# Patient Record
Sex: FEMALE | Race: WHITE | NOT HISPANIC OR LATINO | Employment: OTHER | URBAN - METROPOLITAN AREA
[De-identification: names, ages, dates, MRNs, and addresses within clinical notes are randomized per-mention and may not be internally consistent; named-entity substitution may affect disease eponyms.]

---

## 2017-05-11 ENCOUNTER — OFFICE VISIT - RIVER FALLS (OUTPATIENT)
Dept: FAMILY MEDICINE | Facility: CLINIC | Age: 77
End: 2017-05-11

## 2017-05-11 ENCOUNTER — COMMUNICATION - RIVER FALLS (OUTPATIENT)
Dept: FAMILY MEDICINE | Facility: CLINIC | Age: 77
End: 2017-05-11

## 2017-05-11 ASSESSMENT — MIFFLIN-ST. JEOR: SCORE: 1275.54

## 2017-05-18 LAB — GLUCOSE BLD-MCNC: 96 MG/DL (ref 65–99)

## 2017-11-20 ENCOUNTER — AMBULATORY - RIVER FALLS (OUTPATIENT)
Dept: FAMILY MEDICINE | Facility: CLINIC | Age: 77
End: 2017-11-20

## 2018-05-07 ENCOUNTER — OFFICE VISIT - RIVER FALLS (OUTPATIENT)
Dept: FAMILY MEDICINE | Facility: CLINIC | Age: 78
End: 2018-05-07

## 2018-05-07 ASSESSMENT — MIFFLIN-ST. JEOR: SCORE: 1260.12

## 2019-06-03 ENCOUNTER — OFFICE VISIT - RIVER FALLS (OUTPATIENT)
Dept: FAMILY MEDICINE | Facility: CLINIC | Age: 79
End: 2019-06-03

## 2019-06-03 ASSESSMENT — MIFFLIN-ST. JEOR: SCORE: 1214.76

## 2020-07-27 ENCOUNTER — VIRTUAL VISIT - RIVER FALLS (OUTPATIENT)
Dept: FAMILY MEDICINE | Facility: CLINIC | Age: 80
End: 2020-07-27

## 2021-07-07 ENCOUNTER — OFFICE VISIT - RIVER FALLS (OUTPATIENT)
Dept: FAMILY MEDICINE | Facility: CLINIC | Age: 81
End: 2021-07-07

## 2021-07-07 ENCOUNTER — TRANSFERRED RECORDS (OUTPATIENT)
Dept: MULTI SPECIALTY CLINIC | Facility: CLINIC | Age: 81
End: 2021-07-07

## 2021-07-07 ASSESSMENT — MIFFLIN-ST. JEOR: SCORE: 1281.89

## 2021-07-08 ENCOUNTER — COMMUNICATION - RIVER FALLS (OUTPATIENT)
Dept: FAMILY MEDICINE | Facility: CLINIC | Age: 81
End: 2021-07-08

## 2021-07-08 LAB
25(OH)D3 SERPL-MCNC: 57 NG/ML (ref 30–100)
CHOLEST SERPL-MCNC: 253 MG/DL
CHOLEST/HDLC SERPL: 2.7 {RATIO}
ERYTHROCYTE [DISTWIDTH] IN BLOOD BY AUTOMATED COUNT: 12.1 % (ref 11–15)
GLUCOSE BLD-MCNC: 105 MG/DL (ref 65–99)
HCT VFR BLD AUTO: 42 % (ref 35–45)
HDLC SERPL-MCNC: 95 MG/DL
HGB BLD-MCNC: 14.5 GM/DL (ref 11.7–15.5)
LDLC SERPL CALC-MCNC: 136 MG/DL
MCH RBC QN AUTO: 35.3 PG (ref 27–33)
MCHC RBC AUTO-ENTMCNC: 34.5 GM/DL (ref 32–36)
MCV RBC AUTO: 102.2 FL (ref 80–100)
NONHDLC SERPL-MCNC: 158 MG/DL
PLATELET # BLD AUTO: 283 10*3/UL (ref 140–400)
PMV BLD: 11 FL (ref 7.5–12.5)
RBC # BLD AUTO: 4.11 10*6/UL (ref 3.8–5.1)
TRIGL SERPL-MCNC: 116 MG/DL
VIT B12 SERPL-MCNC: 301 PG/ML (ref 200–1100)
WBC # BLD AUTO: 5.6 10*3/UL (ref 3.8–10.8)

## 2021-10-22 ENCOUNTER — OFFICE VISIT - RIVER FALLS (OUTPATIENT)
Dept: FAMILY MEDICINE | Facility: CLINIC | Age: 81
End: 2021-10-22

## 2022-02-11 VITALS
TEMPERATURE: 98.2 F | HEART RATE: 56 BPM | DIASTOLIC BLOOD PRESSURE: 74 MMHG | WEIGHT: 161 LBS | BODY MASS INDEX: 23.05 KG/M2 | HEIGHT: 70 IN | SYSTOLIC BLOOD PRESSURE: 136 MMHG

## 2022-02-11 VITALS
BODY MASS INDEX: 22.56 KG/M2 | HEIGHT: 70 IN | SYSTOLIC BLOOD PRESSURE: 132 MMHG | HEART RATE: 52 BPM | TEMPERATURE: 98 F | WEIGHT: 157.6 LBS | DIASTOLIC BLOOD PRESSURE: 80 MMHG

## 2022-02-11 VITALS
HEART RATE: 60 BPM | WEIGHT: 147.6 LBS | TEMPERATURE: 98 F | DIASTOLIC BLOOD PRESSURE: 70 MMHG | BODY MASS INDEX: 21.13 KG/M2 | HEIGHT: 70 IN | SYSTOLIC BLOOD PRESSURE: 128 MMHG

## 2022-02-11 VITALS
HEIGHT: 70 IN | SYSTOLIC BLOOD PRESSURE: 152 MMHG | HEART RATE: 75 BPM | DIASTOLIC BLOOD PRESSURE: 78 MMHG | BODY MASS INDEX: 23.3 KG/M2

## 2022-02-11 VITALS
BODY MASS INDEX: 23.25 KG/M2 | HEIGHT: 70 IN | HEART RATE: 93 BPM | DIASTOLIC BLOOD PRESSURE: 84 MMHG | SYSTOLIC BLOOD PRESSURE: 148 MMHG | WEIGHT: 162.4 LBS | OXYGEN SATURATION: 97 % | TEMPERATURE: 97.2 F

## 2022-02-16 NOTE — TELEPHONE ENCOUNTER
---------------------  From: Mariah Valdovinos CMA ([facility]Rx Pool ([address]_[place] [facility] [place]))   To: [employer] Message Pool ([address]224_[facility]);     Sent: 5/11/2021 12:11:15 PM CDT  Subject: FW: Medication Management   Due Date/Time: 5/11/2021 8:20:00 AM CDT     last visit 6/2019 Physical  LRF 7/16/2020 #60, 4 Rf's    Pt scheduled for physical 7/7/21    Please advise       ------------------------------------------  From: [facility] #[id number]  To: Esha Fritz MD  Sent: May 10, 2021 8:20:17 AM CDT  Subject: Medication Management  Due: April 27, 2021 12:41:41 AM CDT     ** On Hold Pending Signature **     Dispensed Drug: atropine-diphenoxylate (atropine-diphenoxylate 0.025 mg-2.5 mg oral tablet), TAKE 2 TABLETS BY MOUTH FOUR TIMES DAILY AS NEEDED FOR LOOSE STOOLS  Quantity: 60 tab(s)  Days Supply: 8  Refills: 0  Substitutions Allowed  Notes from Pharmacy:  ---------------------------------------------------------------  From: Claire Hoover CMA ([facility] Message Pool (32224_[facility]))   To: Esha Fritz MD;     Sent: 5/11/2021 1:50:31 PM CDT  Subject: FW: Medication Management   Due Date/Time: 5/11/2021 8:20:00 AM CDT---------------------  From: Esha Fritz MD   To: [facility] #[id number]    Sent: 5/11/2021 3:06:11 PM CDT  Subject: FW: Medication Management     ** Not Approved: responded to by other means **  atropine-diphenoxylate (DIPHENOXYLATE/ATROPINE 2.5MG TABS)  TAKE 2 TABLETS BY MOUTH FOUR TIMES DAILY AS NEEDED FOR LOOSE STOOLS  Qty:  60 tab(s)        Days Supply:  8        Refills:  0          Substitutions Allowed     Route To [facility] #[id number]  ** Submitted: **  Order:atropine-diphenoxylate (Lomotil 2.5 mg-0.025 mg oral tablet)  2 tab(s)  PO  QID  start prior authorization if needed  Qty:  60 tab(s)        Refills:  1          Substitutions Allowed     PRN  for loose stools      Route To [facility] #[id number]    Signed by Esha Fritz MD  5/11/2021 8:06:00  PM [facility]---------------------  From: Esha Fritz MD   To: [employer] Pool (32224_[facility]);     Sent: 5/11/2021 3:07:05 PM CDT  Subject: RE: Medication Management

## 2022-02-16 NOTE — TELEPHONE ENCOUNTER
Entered by Claire Hoover CMA on September 07, 2021 11:07:08 AM CDT  ---------------------  From: Claire Hoover CMA   To: [facility] #[id number]    Sent: 9/7/2021 11:07:08 AM CDT  Subject: Medication Management     ** Not Approved: Patient has requested refill too soon, LR on 7/7/21 for # 12 and 4 refills **  ergocalciferol (VITAMIN D2 50,000IU (ERGO) CAP RX)  TAKE 1 CAPSULE BY MOUTH EVERY WEEK  Qty:  12 cap(s)        Days Supply:  84        Refills:  0          Substitutions Allowed     Route To Pharmacy - [facility] #[id number]   Signed by Claire Hoover CMA            ------------------------------------------  From: [facility] #[id number]  To: Esha Fritz MD  Sent: September 7, 2021 2:39:08 AM CDT  Subject: Medication Management  Due: August 20, 2021 11:16:59 AM CDT     ** On Hold Pending Signature **     Dispensed Drug: ergocalciferol (ergocalciferol 1.25 mg (50,000 intl units) oral capsule), TAKE 1 CAPSULE BY MOUTH EVERY WEEK  Quantity: 12 cap(s)  Days Supply: 84  Refills: 0  Substitutions Allowed  Notes from Pharmacy:  ------------------------------------------

## 2022-02-16 NOTE — TELEPHONE ENCOUNTER
Entered by Sena Kern CMA on September 17, 2019 2:22:02 PM CDT  ---------------------  From: Sena Kern CMA   To: [facility] #[id number]    Sent: 9/17/2019 2:22:02 PM CDT  Subject: Medication Management     ** Not Approved: Refill not appropriate, Pt was given an Rx on 6/3/19 #120 w/5 refills. **  traMADol (TRAMADOL 50MG TABLETS)  TAKE 1 TABLET BY MOUTH EVERY 4 HOURS AS NEEDED FOR PAIN  Qty:  120 tab(s)        Days Supply:  7        Refills:  0          Substitutions Allowed     Route To Pharmacy - [facility] #[id number]   Signed by Sena Kern CMA            ------------------------------------------  From: [facility] #[id number]  To: Esha Fritz MD  Sent: September 17, 2019 1:47:06 PM CDT  Subject: Medication Management  Due: September 18, 2019 1:47:06 PM CDT    ** On Hold Pending Signature **  Drug: traMADol (traMADol 50 mg oral tablet)  1 TAB(S) PO Q4 HRS,PRN:FOR PAIN  Quantity: 120 tab(s)    Days Supply: 0         Refills: 5  Substitutions Allowed  Notes from Pharmacy: patient will contact pharmacy when refills are needed    Dispensed Drug: traMADol (traMADol 50 mg oral tablet)  TAKE 1 TABLET BY MOUTH EVERY 4 HOURS AS NEEDED FOR PAIN  Quantity: 120 tab(s)    Days Supply: 7         Refills: 0  Substitutions Allowed  Notes from Pharmacy:   ------------------------------------------Request states that the last order was from 5/2018.

## 2022-02-16 NOTE — TELEPHONE ENCOUNTER
Entered by Laura Bustillos CMA on April 30, 2020 10:32:34 AM CDT  ---------------------  From: Laura Bustillos CMA   To: [facility] #[id number]    Sent: 4/30/2020 10:32:34 AM CDT  Subject: Medication Management     ** Not Approved: Patient needs appointment **  traMADol (TRAMADOL 50MG TABLETS)  TAKE 1 TABLET BY MOUTH EVERY 4 HOURS AS NEEDED FOR PAIN  Qty:  120 tab(s)        Days Supply:  20        Refills:  0          Substitutions Allowed     Route To Pharmacy - [facility] #[id number]   Signed by Laura Bustillos CMA            ------------------------------------------  From: [facility] #[id number]  To: Esha Fritz MD  Sent: April 30, 2020 8:09:20 AM CDT  Subject: Medication Management  Due: May 1, 2020 8:09:20 AM CDT    ** On Hold Pending Signature **  Drug: traMADol (traMADol 50 mg oral tablet)  TAKE 1 TABLET BY MOUTH EVERY 4 HOURS AS NEEDED FOR PAIN  Quantity: 120 tab(s)  Days Supply: 20  Refills: 0  Substitutions Allowed  Notes from Pharmacy:     Dispensed Drug: traMADol (traMADol 50 mg oral tablet)  TAKE 1 TABLET BY MOUTH EVERY 4 HOURS AS NEEDED FOR PAIN  Quantity: 120 tab(s)  Days Supply: 20  Refills: 0  Substitutions Allowed  Notes from Pharmacy:   ------------------------------------------

## 2022-02-16 NOTE — TELEPHONE ENCOUNTER
Entered by Clara Pacheco MA on January 02, 2020 9:55:57 AM CST  ---------------------  From: Clara Pacheco MA   To: [facility] #[id number]    Sent: 1/2/2020 9:55:57 AM CST  Subject: Medication Management     ** Not Approved: Refill not appropriate, Filled 12/30/2019 **  atropine-diphenoxylate (DIPHENOXYLATE/ATROPINE 2.5MG TABS)  TAKE 2 TABLETS BY MOUTH FOUR TIMES DAILY AS NEEDED FOR LOOSE STOOLS  Qty:  60 tab(s)        Days Supply:  8        Refills:  0          Substitutions Allowed     Route To Pharmacy - [facility] #[id number]   Signed by Clara Pacheco MA            ------------------------------------------  From: [facility] #[id number]  To: Esha Fritz MD  Sent: December 30, 2019 1:56:22 PM CST  Subject: Medication Management  Due: December 31, 2019 1:56:22 PM CST    ** On Hold Pending Signature **  Drug: atropine-diphenoxylate (atropine-diphenoxylate 0.025 mg-2.5 mg oral tablet)  TAKE 2 TABLETS BY MOUTH FOUR TIMES DAILY AS NEEDED FOR LOOSE STOOLS  Quantity: 60 tab(s)  Days Supply: 8  Refills: 0  Substitutions Allowed  Notes from Pharmacy:     Dispensed Drug: atropine-diphenoxylate (atropine-diphenoxylate 0.025 mg-2.5 mg oral tablet)  TAKE 2 TABLETS BY MOUTH FOUR TIMES DAILY AS NEEDED FOR LOOSE STOOLS  Quantity: 60 tab(s)  Days Supply: 8  Refills: 0  Substitutions Allowed  Notes from Pharmacy:   ------------------------------------------
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
## 2022-02-16 NOTE — TELEPHONE ENCOUNTER
Entered by Arleen Alvarez CMA on July 16, 2020 8:41:01 AM CDT  Return Call    Time: 8:37am    Note: Called patient she is still in [place] and is planning to be back in [place] in November. She is unable to get back here due to travel restrictions.         ---------------------  From: Esha Fritz MD   To: [employer] ([address]224_[facility]);     Sent: 7/15/2020 5:02:57 PM CDT  Subject: upcoming visit     Please check with patient if she is in [place] - if so, will need to wait until she is in [place] to do virtual visit for AWV due to state licensing issues. I am happy to refill medications until that time.---------------------  From: Arleen Alvarez CMA ([employer] ([address]_[facility]))   To: Esha Fritz MD;     Sent: 7/16/2020 8:41:15 AM CDT  Subject: RE: upcoming visit  ** Submitted: **  Order:traMADol (traMADol 50 mg oral tablet)  1 tab(s)  Oral  q4 hrs  Qty:  120 tab(s)        Refills:  4          PRN  AS NEEDED FOR PAIN      Route To [facility] #[id number]    Signed by Esha Fritz MD  7/16/2020 2:17:00 PM UT    ** Submitted: **  Order:ergocalciferol (ergocalciferol 50,000 intl units (1.25 mg) oral capsule)  See Instructions  TAKE 1 CAPSULE BY MOUTH EVERY WEEK  Qty:  12 cap(s)        Refills:  4          Route To Pharmacy [facility] #[id number]    Signed by Esha Fritz MD  7/16/2020 2:17:00 PM UTC    ** Submitted: **  Order:ciprofloxacin (ciprofloxacin 500 mg oral tablet)  See Instructions  TAKE 1 TABLET BY MOUTH EVERY 12 HOURS FOR 10 DAYS AS NEEDED  Qty:  20 tab(s)        Refills:  4          PRN  as needed      Route To Pharmacy [facility] #[id number]    Signed by Esha Fritz MD  7/16/2020 2:17:00 PM UTC    ** Submitted: **  Order:atropine-diphenoxylate (Lomotil 2.5 mg-0.025 mg oral tablet)  2 tab(s)  PO  QID  start prior authorization if needed  Qty:  60 tab(s)        Refills:  4          Substitutions Allowed     PRN  for loose stools      Route To Pharmacy -  [facility] #[id number]    Signed by Esha Fritz MD  7/16/2020 2:17:00 PM [facility]        I can extend all prescriptions until November and we will go from there. If there is anything particular she wants to talk to me about, I could give her a call that week just to touch base.---------------------  From: Esha Fritz MD   To: [employer] [facility] ([address]_[facility]);     Sent: 7/16/2020 9:18:47 AM CDT  Subject: RE: upcoming visitnoted

## 2022-02-16 NOTE — LETTER
(Inserted Image. Unable to display)   [address] [place] [address]  [phone] (phone) [phone] (fax)  July 08, 2021      GENARO PALENCIA      [facility] [place] [address]      Dear GENARO,    Thank you for selecting [facility] for your healthcare needs. Below you will find the results of the recent tests done at our clinic.     Your lab results are stable. B12 level is at lower limits of normal range. Please consider taking a B12 supplement daily. No other changes needed.     Result Name Current Result Reference Range   Glucose Level (mg/dL) ((H)) 105 7/7/2021 65 - 99   Vitamin B12 Level (pg/mL)  301 7/7/2021 200 - 1,100   Vitamin D 25 OH (ng/mL)  57 7/7/2021 30 - 100   Cholesterol (mg/dL) ((H)) 253 7/7/2021  - <200   Non-HDL Cholesterol ((H)) 158 7/7/2021  - <130   HDL (mg/dL)  95 7/7/2021 > OR = 50 -    Cholesterol/HDL Ratio  2.7 7/7/2021  - <5.0   LDL ((H)) 136 7/7/2021    Triglyceride (mg/dL)  116 7/7/2021  - <150   WBC  5.6 7/7/2021 3.8 - 10.8   RBC  4.11 7/7/2021 3.80 - 5.10   Hgb (gm/dL)  14.5 7/7/2021 11.7 - 15.5   Hct (%)  42.0 7/7/2021 35.0 - 45.0   MCV (fL) ((H)) 102.2 7/7/2021 80.0 - 100.0   MCH (pg) ((H)) 35.3 7/7/2021 27.0 - 33.0   MCHC (gm/dL)  34.5 7/7/2021 32.0 - 36.0   RDW (%)  12.1 7/7/2021 11.0 - 15.0   Platelet  283 7/7/2021 140 - 400   MPV (fL)  11.0 7/7/2021 7.5 - 12.5       Please contact me or my assistant at [phone] if you have any questions or concerns.     Sincerely,        Esha Fritz M.D.

## 2022-02-16 NOTE — TELEPHONE ENCOUNTER
---------------------  From: Clara Pacheco MA ([facility] ([id number] [facility] [place]))   To: Roberto Robertson PA-C;     Sent: 12/30/2019 2:10:31 PM CST  Subject: FW: Medication Management   Due Date/Time: 12/31/2019 1:56:00 PM CST     PCP:   [facility]    Medication:   atropine-diphenoxylate  Last Filled:  6/3/19    Quantity:  60  Refills:  5    Date of last office visit and reason:   6/3/19 AWV  Date of last labs pertaining to condition:  n/a      Return to Clinic order placed?  Yes June 2020         ------------------------------------------  From: [facility] #[id number]  To: Esha Fritz MD  Sent: December 30, 2019 1:56:22 PM CST  Subject: Medication Management  Due: December 31, 2019 1:56:22 PM CST    ** On Hold Pending Signature **  Drug: atropine-diphenoxylate (atropine-diphenoxylate 0.025 mg-2.5 mg oral tablet)  TAKE 2 TABLETS BY MOUTH FOUR TIMES DAILY AS NEEDED FOR LOOSE STOOLS  Quantity: 60 tab(s)  Days Supply: 8  Refills: 0  Substitutions Allowed  Notes from Pharmacy:     Dispensed Drug: atropine-diphenoxylate (atropine-diphenoxylate 0.025 mg-2.5 mg oral tablet)  TAKE 2 TABLETS BY MOUTH FOUR TIMES DAILY AS NEEDED FOR LOOSE STOOLS  Quantity: 60 tab(s)  Days Supply: 8  Refills: 0  Substitutions Allowed  Notes from Pharmacy:   ------------------------------------------
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
## 2022-02-16 NOTE — NURSING NOTE
Comprehensive Intake Entered On:  6/3/2019 1:08 PM CDT    Performed On:  6/3/2019 1:02 PM CDT by Clara Pacheco MA               Summary   Chief Complaint :   Annual Exam    Weight Measured :   147.6 lb(Converted to: 147 lb 10 oz, 66.95 kg)    Height Measured :   70 in(Converted to: 5 ft 10 in, 177.80 cm)    Body Mass Index :   21.18 kg/m2   Body Surface Area :   1.82 m2   Systolic Blood Pressure :   128 mmHg   Diastolic Blood Pressure :   70 mmHg   Mean Arterial Pressure :   89 mmHg   Peripheral Pulse Rate :   60 bpm   BP Site :   Left arm   Pulse Site :   Radial artery   BP Method :   Manual   HR Method :   Manual   Temperature Tympanic :   98.0 DegF(Converted to: 36.7 DegC)    Clara Pacheco MA - 6/3/2019 1:02 PM CDT   Health Status   Allergies Verified? :   Yes   Medication History Verified? :   Yes   Immunizations Current :   Other: Declined   Medical History Verified? :   Yes   Pre-Visit Planning Status :   Completed   Tobacco Use? :   Never smoker   Clara Pacheco MA - 6/3/2019 1:02 PM CDT   Consents   Consent for Immunization Exchange :   Consent Granted   Consent for Immunizations to Providers :   Consent Granted   Clara Pacheco MA - 6/3/2019 1:02 PM CDT   Meds / Allergies   (As Of: 6/3/2019 1:08:52 PM CDT)   Allergies (Active)   aspirin  Estimated Onset Date:   Unspecified ; Created By:   Sena Valencia; Reaction Status:   Active ; Category:   Drug ; Substance:   aspirin ; Type:   Allergy ; Updated By:   Sena Valencia; Reviewed Date:   6/3/2019 1:06 PM CDT      penicillin  Estimated Onset Date:   Unspecified ; Reactions:   Hives ; Created By:   Sena Valencia; Reaction Status:   Active ; Category:   Drug ; Substance:   penicillin ; Type:   Allergy ; Updated By:   Sena Valencia; Reviewed Date:   6/3/2019 1:06 PM CDT        Medication List   (As Of: 6/3/2019 1:08:52 PM CDT)   Prescription/Discharge Order    traMADol 50 mg oral tablet  :   traMADol 50 mg oral tablet ; Status:   Prescribed ; Ordered  As Mnemonic:   traMADol 50 mg oral tablet ; Simple Display Line:   1 tab(s), Oral, q4 hrs, PRN: AS NEEDED FOR PAIN, 120 tab(s), 5 Refill(s) ; Ordering Provider:   Esha Fritz MD; Catalog Code:   traMADol ; Order Dt/Tm:   2/6/2019 10:07:06 AM          atropine-diphenoxylate  :   atropine-diphenoxylate ; Status:   Prescribed ; Ordered As Mnemonic:   Lomotil 2.5 mg-0.025 mg oral tablet ; Simple Display Line:   2 tab(s), PO, QID, PRN: for loose stools, 60 tab(s), 5 Refill(s) ; Ordering Provider:   Esha Fritz MD; Catalog Code:   atropine-diphenoxylate ; Order Dt/Tm:   11/30/2018 2:03:22 PM          ergocalciferol 50,000 intl units (1.25 mg) oral capsule  :   ergocalciferol 50,000 intl units (1.25 mg) oral capsule ; Status:   Prescribed ; Ordered As Mnemonic:   ergocalciferol 50,000 intl units (1.25 mg) oral capsule ; Simple Display Line:   See Instructions, TAKE 1 CAPSULE BY MOUTH EVERY WEEK, 12 cap(s), 4 Refill(s) ; Ordering Provider:   Esha Fritz MD; Catalog Code:   ergocalciferol ; Order Dt/Tm:   5/7/2018 2:23:57 PM          ergocalciferol  :   ergocalciferol ; Status:   Prescribed ; Ordered As Mnemonic:   Vitamin D2 50,000 intl units (1.25 mg) oral capsule ; Simple Display Line:   50,000 International Unit, 1 cap(s), po, qweek, 12 EA, 4 Refill(s) ; Ordering Provider:   Esha Fritz MD; Catalog Code:   ergocalciferol ; Order Dt/Tm:   5/7/2018 1:27:15 PM          ciprofloxacin  :   ciprofloxacin ; Status:   Prescribed ; Ordered As Mnemonic:   ciprofloxacin 500 mg oral tablet ; Simple Display Line:   See Instructions, TAKE 1 TABLET BY MOUTH EVERY 12 HOURS FOR 10 DAYS AS NEEDED, PRN: as needed, 20 tab(s), 2 Refill(s) ; Ordering Provider:   Esha Fritz MD; Catalog Code:   ciprofloxacin ; Order Dt/Tm:   5/7/2018 1:24:47 PM

## 2022-02-16 NOTE — NURSING NOTE
Medicare Visit Entered On:  7/7/2021 1:05 PM CDT    Performed On:  7/7/2021 12:58 PM CDT by Mariah Valdovinos CMA               Summary   Chief Complaint :   AWV   Weight Measured :   162.4 lb(Converted to: 162 lb 6 oz, 73.663 kg)    Height Measured :   70 in(Converted to: 5 ft 10 in, 177.80 cm)    Body Mass Index :   23.3 kg/m2   Body Surface Area :   1.91 m2   Systolic Blood Pressure :   157 mmHg (HI)    Diastolic Blood Pressure :   93 mmHg (HI)    Mean Arterial Pressure :   114 mmHg   Peripheral Pulse Rate :   93 bpm   Temperature Tympanic :   97.2 DegF(Converted to: 36.2 DegC)  (LOW)    Oxygen Saturation :   97 %   Mariah Valdovinos CMA - 7/7/2021 12:58 PM CDT   Health Status   Allergies Verified? :   Yes   Medication History Verified? :   Yes   Immunizations Current :   Other: Declined   Medical History Verified? :   Yes   Pre-Visit Planning Status :   Completed   Mariah Valdovinos CMA - 7/7/2021 12:58 PM CDT   Consents   Consent for Immunization Exchange :   Consent Granted   Consent for Immunizations to Providers :   Consent Granted   Mariah Valdovinos CMA - 7/7/2021 12:58 PM CDT   Social History   Social History   (As Of: 7/7/2021 1:05:32 PM CDT)   Alcohol:  Current      Current, Wine (5 oz), Daily, 2 drinks/episode average.   (Last Updated: 5/11/2017 9:48:40 AM CDT by Jie Brenner LPN)          Tobacco:  Denies Tobacco Use      Never   (Last Updated: 12/21/2011 7:35:28 AM CST by Lety Chen)   Never (less than 100 in lifetime)   (Last Updated: 7/7/2021 12:59:28 PM CDT by Mariah Valdovinos CMA)          Electronic Cigarette/Vaping:        Electronic Cigarette Use: Never.   (Last Updated: 7/7/2021 12:59:33 PM CDT by Mariah Valdovinos CMA)          Substance Abuse:  Denies Substance Abuse      Never   (Last Updated: 6/6/2019 11:18:48 AM CDT by Lety Chen)          Employment/School:        Retired, Work/School description: Teacher..  Highest education level: Masters.   (Last Updated: 6/6/2019 11:19:28 AM CDT by Lety Chen)           Home/Environment:        Marital status: .  1 children.  Living situation: Home/Independent.  Feels unsafe at home: No.  Safe place to go: No.  Family/Friends available for support: No.   (Last Updated: 5/11/2017 10:01:17 AM CDT by Jie Brenner LPN)          Nutrition/Health:        Type of diet: Regular.   (Last Updated: 12/19/2011 8:42:08 AM CST by Jie Brenner LPN)          Exercise:  Regular exercise      Exercise type: Walking.   Comments:  12/19/2011 8:42 AM - Jie Brenner LPN: 3-5 miles daily.in [place].   (Last Updated: 12/19/2011 8:42:35 AM CST by Jie Brenner LPN)          Sexual:        Sexually active: Yes.  Identifies as female, Sexual orientation: Straight or heterosexual.  History of STD: No.  History of sexual abuse: No.   (Last Updated: 6/6/2019 11:20:15 AM CDT by Lety Chen)          Other:        First menses age 13.  No abnormal Pap smear.  LMP age 55.   (Last Updated: 6/6/2019 11:18:33 AM CDT by Lety Chen)            Geriatric Depression Screening   Geriatric Depression Satisfied Life :   No   Geriatric Depression Dropped Activities :   No   Geriatric Depression Life Empty :   No   Geriatric Depression Bored :   No   Geriatric Depression Good Spirits :   Yes   Geriatric Depression Afraid Bad Things :   No   Geriatric Depression Feel Happy :   Yes   Geriatric Depression Feel Helpless :   No   Geriatric Depression Prefer to Stay Home :   No   Geriatric Depression Memory Problems :   No   Geriatric Depression Wonderful Be Alive :   Yes   Geriatric Depression Feel Worthless :   No   Geriatric Depression Situation Hopeless :   No   Geriatric Depression Others Better Off :   No   Geriatric Depression Full of Energy :   No   Geriatric Depression Total Score :   2    Mariah Valdovinos CMA - 7/7/2021 12:58 PM CDT   Hearing and Vision Screening   Audiogram Result Right Ear :   Fail   Audiogram Result Left Ear :   Fail   Hearing Screen Comments :   has an upcoming appt with  audiolgy - only picked up 2000hz   Mariah Valdovinos CMA - 7/7/2021 1:05 PM CDT   Home Safety Screen   Emergency Numbers Kept/Updated :   Yes   Aware of Smoking Dangers :   Yes   Smoke Alarms/Fire Extinguisher Available :   Yes   Household Members Fire Safety Knowledge :   Yes   Floor Rugs Removed or Fastened :   Yes   Mats in Bathtub/Shower :   Yes   Outdoor Clutter Safety :   Yes   Indoor Clutter Safety :   Yes   Electrical Cord Safety :   Yes   Mariah Valdovinos CMA - 7/7/2021 12:58 PM CDT   Advance Directives   Advance Directive :   Yes   Mariah Valdovinos CMA - 7/7/2021 12:58 PM CDT   [place] Fall Risk   History of Falls in Last 3 Months [place] :   No   Mariah Valdovinos CMA - 7/7/2021 12:58 PM CDT   Functional Assessment   Focused Functional Assessment Grid   Bathing :   Independent (2)   Dressing :   Independent (2)   Toileting :   Independent (2)   Transferring Bed or Chair :   Independent (2)   Feeding :   Independent (2)   Mariah Valdovinos CMA - 7/7/2021 12:58 PM CDT   Capable of Shopping :   Yes   Capable of Walking :   Yes   Capable of Housekeeping :   Yes   Capable of Managing Medications :   Yes   Capable of Handling Finances :   Yes   Mariah Valdovinos CMA - 7/7/2021 12:58 PM CDT

## 2022-02-16 NOTE — NURSING NOTE
Depression Screening Entered On:  6/3/2019 1:50 PM CDT    Performed On:  6/3/2019 1:50 PM CDT by Arleen Alvarez CMA               Depression Screening   Little Interest - Pleasure in Activities :   Not at all   Feeling Down, Depressed, Hopeless :   Not at all   Initial Depression Screen Score :   0    Trouble Falling or Staying Asleep :   Not at all   Feeling Tired or Little Energy :   Not at all   Poor Appetite or Overeating :   Not at all   Feeling Bad About Yourself :   Not at all   Trouble Concentrating :   Not at all   Moving or Speaking Slowly :   Not at all   Thoughts Better Off Dead or Hurting Self :   Not at all   Detailed Depression Screen Score :   0    Total Depression Screen Score :   0    Arleen Alvarez CMA - 6/3/2019 1:50 PM CDT
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
## 2022-02-16 NOTE — NURSING NOTE
Quick Intake Entered On:  7/7/2021 1:37 PM CDT    Performed On:  7/7/2021 1:37 PM CDT by Esha Fritz MD               Summary   Advance Directive :   Yes   Height Measured :   70 in(Converted to: 5 ft 10 in, 177.80 cm)    Systolic Blood Pressure :   148 mmHg (HI)    Diastolic Blood Pressure :   84 mmHg (HI)    Mean Arterial Pressure :   105 mmHg   Esha Fritz MD - 7/7/2021 1:37 PM CDT

## 2022-02-16 NOTE — TELEPHONE ENCOUNTER
---------------------  From: Morena Ortiz CMA (Phone Messages Pool ([id number]_[facility]))   To: Phone Messages Pool ([address]_[place]);     Sent: 3/5/2019 4:44:46 PM CST  Subject: Phone Note: medications     Phone Message    PCP:   CODY       Time of Call:  4:11 pm       Person Calling:  pt  Phone number:  [phone]    Returned call at: n/a    Note:   Pt calls stating that she went to the pharmacy to get prescriptions, she was told that insurance is not covering them and that we would need to contact her insurance. I tried calling her pharmacy - was on hold for 10 minutes. Will need to try contacting them again tomorrow.    [facility]    Last office visit and reason:  5/7/18; med refills

## 2022-02-16 NOTE — TELEPHONE ENCOUNTER
---------------------  From: Sena Kern CMA (eRx Pool ([id number]_[place]))   To: Esha Fritz MD;     Sent: 2/6/2019 9:45:55 AM CST  Subject: FW: Medication Management: Tramadol   Due Date/Time: 2/7/2019 9:14:00 AM CST     Medication Refill Needing Approval  PCP:   CODY  Medication:   Tramadol  Last Filled:  5/7/18    Quantity:  120  Refills:  5  CSA on file?   n/a     Date of last office visit and reason:   5/7/18  Date of last labs pertaining to condition:  n/a    Return to Clinic order placed?  due in May for a AWV        ------------------------------------------  From: [facility] [id number]  To: Esha Fritz MD  Sent: February 6, 2019 9:14:37 AM CST  Subject: Medication Management  Due: February 7, 2019 9:14:37 AM CST    ** On Hold Pending Signature **  Drug: traMADol (traMADol 50 mg oral tablet)  1 TAB(S) PO Q4 HRS,PRN:FOR PAIN  Quantity: 120 tab(s)    Days Supply: 0         Refills: 4  Substitutions Allowed  Notes from Pharmacy: patient will contact pharmacy when refills are needed    Dispensed Drug: traMADol (traMADol 50 mg oral tablet)  TAKE 1 TABLET BY MOUTH EVERY 4 HOURS AS NEEDED FOR PAIN  Quantity: 120 tab(s)    Days Supply: 20        Refills: 0  Substitutions Allowed  Notes from Pharmacy:   ---------------------------------------------------------------  From: Esha Fritz MD   To: [facility] [id number]    Sent: 2/6/2019 10:07:07 AM CST  Subject: FW: Medication Management: Tramadol     ** Submitted: **  Complete:traMADol (traMADol 50 mg oral tablet)   Signed by Esha Fritz MD  2/6/2019 10:07:00 AM    ** Approved with modifications: **  traMADol (TRAMADOL 50MG TABLETS)  TAKE 1 TABLET BY MOUTH EVERY 4 HOURS AS NEEDED FOR PAIN  Qty:  120 tab(s)        Days Supply:  20        Refills:  5          JULIANA     Route To Pharmacy - [facility] [id number]

## 2022-02-16 NOTE — TELEPHONE ENCOUNTER
---------------------  From: Claire Hoover CMA (eRx Pool ([id number]_[facility]))   To: Esha Fritz MD;     Sent: 6/17/2021 11:06:49 AM CDT  Subject: FW: Medication Management   Due Date/Time: 6/17/2021 7:28:00 PM CDT     LR on 7/16/20 for # 120 and 4 refills. Last px in July 2020. Has appt for px scheduled w/ you on 7/7. OK to refill?       ------------------------------------------  From: [facility] #[id number]  To: Esha Fritz MD  Sent: June 16, 2021 7:28:05 PM CDT  Subject: Medication Management  Due: June 4, 2021 8:26:15 PM CDT     ** On Hold Pending Signature **     Dispensed Drug: traMADol (traMADol 50 mg oral tablet), TAKE 1 TABLET BY MOUTH EVERY 4 HOURS AS NEEDED FOR PAIN  Quantity: 120 tab(s)  Days Supply: 20  Refills: 0  Substitutions Allowed  Notes from Pharmacy:  ---------------------------------------------------------------  From: Esha Fritz MD   To: [facility] #[id number]    Sent: 6/17/2021 1:48:07 PM CDT  Subject: FW: Medication Management     ** Not Approved: responded to by other means **  traMADol (TRAMADOL 50MG TABLETS)  TAKE 1 TABLET BY MOUTH EVERY 4 HOURS AS NEEDED FOR PAIN  Qty:  120 tab(s)        Days Supply:  20        Refills:  0          Substitutions Allowed     Route To Pharmacy - [facility] #[id number]  ** Submitted: **  Order:traMADol (traMADol 50 mg oral tablet)  1 tab(s)  Oral  q4 hrs  Qty:  120 tab(s)        Refills:  4          PRN  AS NEEDED FOR PAIN      Route To Pharmacy - [facility] #[id number]    Signed by Esha Fritz MD  6/17/2021 6:48:00 PM [facility]---------------------  From: Esha Fritz MD   To: eRx Pool ([address]_[place]);     Sent: 6/17/2021 1:48:57 PM CDT  Subject: RE: Medication Management

## 2022-02-16 NOTE — LETTER
(Inserted Image. Unable to display)   May 08, 2019      GENARO PALENCIA  [address] [place] [id number]        Dear GENARO,      Thank you for selecting [facility] ([facility]viously [facility] for your healthcare needs.     Our records indicate you are due for the following services:     Annual Physical    To schedule an appointment or if you have further questions, please contact your primary clinic:   [facility]          [phone]   [facility]    [phone]             [facility]         [phone]      Powered by [employer]    Sincerely,    Esha Fritz M.D.

## 2022-02-16 NOTE — NURSING NOTE
Vital Signs Entered On:  7/7/2021 1:05 PM CDT    Performed On:  7/7/2021 1:05 PM CDT by Mariah Valdovinos CMA               Vital Signs   Systolic Blood Pressure :   155 mmHg (HI)    Diastolic Blood Pressure :   89 mmHg (HI)    Mean Arterial Pressure :   111 mmHg   Mariah Valdovinos CMA - 7/7/2021 1:05 PM CDT

## 2022-02-16 NOTE — NURSING NOTE
Comprehensive Intake Entered On:  10/22/2021 11:05 AM CDT    Performed On:  10/22/2021 10:58 AM CDT by Donald Harris LPN               Summary   Chief Complaint :   HTN Follow-Up, Pap Smear, Check ears post-flight, balance seems to be off again.   Advance Directive :   Yes   Height Measured :   70 in(Converted to: 5 ft 10 in, 177.80 cm)    Systolic Blood Pressure :   152 mmHg (HI)    Diastolic Blood Pressure :   78 mmHg   Mean Arterial Pressure :   103 mmHg   Peripheral Pulse Rate :   75 bpm   BP Site :   Right arm   Pulse Site :   Radial artery   BP Method :   Manual   HR Method :   Manual   Donald Harris LPN - 10/22/2021 10:58 AM CDT   Consents   Consent for Immunization Exchange :   Consent Granted   Consent for Immunizations to Providers :   Consent Granted   Donald Harris LPN - 10/22/2021 10:58 AM CDT   Meds / Allergies   (As Of: 10/22/2021 11:05:46 AM CDT)   Allergies (Active)   aspirin  Estimated Onset Date:   Unspecified ; Created By:   Sena Valencia; Reaction Status:   Active ; Category:   Drug ; Substance:   aspirin ; Type:   Allergy ; Updated By:   Sena Valencia; Reviewed Date:   10/22/2021 10:59 AM CDT      nepafenac ophthalmic  Estimated Onset Date:   <not entered> 4/21/2011 ; Reactions:   Urticaria ; Created By:   Donald Harris LPN; Reaction Status:   Active ; Category:   Drug ; Substance:   nepafenac ophthalmic ; Type:   Allergy ; Updated By:   Donald Harris LPN; Reviewed Date:   10/22/2021 10:59 AM CDT      penicillin  Estimated Onset Date:   Unspecified ; Reactions:   Hives ; Created By:   Sena Valencia; Reaction Status:   Active ; Category:   Drug ; Substance:   penicillin ; Type:   Allergy ; Updated By:   Sena Valencia; Reviewed Date:   10/22/2021 10:59 AM CDT        Medication List   (As Of: 10/22/2021 11:05:46 AM CDT)   Prescription/Discharge Order    atropine-diphenoxylate  :   atropine-diphenoxylate ; Status:   Prescribed ; Ordered As Mnemonic:   Lomotil 2.5 mg-0.025 mg  oral tablet ; Simple Display Line:   2 tab(s), PO, QID, start prior authorization if needed, PRN: for loose stools, 60 tab(s), 3 Refill(s) ; Ordering Provider:   Esha Fritz MD; Catalog Code:   atropine-diphenoxylate ; Order Dt/Tm:   7/7/2021 9:58:17 PM CDT          ciprofloxacin  :   ciprofloxacin ; Status:   Prescribed ; Ordered As Mnemonic:   ciprofloxacin 500 mg oral tablet ; Simple Display Line:   See Instructions, TAKE 1 TABLET BY MOUTH EVERY 12 HOURS FOR 10 DAYS AS NEEDED, PRN: as needed, 20 tab(s), 4 Refill(s) ; Ordering Provider:   Esha Fritz MD; Catalog Code:   ciprofloxacin ; Order Dt/Tm:   7/7/2021 9:58:16 PM CDT          ergocalciferol  :   ergocalciferol ; Status:   Prescribed ; Ordered As Mnemonic:   ergocalciferol 50,000 intl units (1.25 mg) oral capsule ; Simple Display Line:   See Instructions, TAKE 1 CAPSULE BY MOUTH EVERY WEEK, 12 cap(s), 4 Refill(s) ; Ordering Provider:   Esha Fritz MD; Catalog Code:   ergocalciferol ; Order Dt/Tm:   7/7/2021 9:58:16 PM CDT          ergocalciferol  :   ergocalciferol ; Status:   Prescribed ; Ordered As Mnemonic:   Vitamin D2 50,000 intl units (1.25 mg) oral capsule ; Simple Display Line:   50,000 International Unit, 1 cap(s), po, qweek, 12 EA, 4 Refill(s) ; Ordering Provider:   Esha Fritz MD; Catalog Code:   ergocalciferol ; Order Dt/Tm:   5/7/2018 1:27:15 PM CDT          traMADol  :   traMADol ; Status:   Prescribed ; Ordered As Mnemonic:   traMADol 50 mg oral tablet ; Simple Display Line:   1 tab(s), Oral, q4 hrs, PRN: AS NEEDED FOR PAIN, 120 tab(s), 4 Refill(s) ; Ordering Provider:   Esha Fritz MD; Catalog Code:   traMADol ; Order Dt/Tm:   6/17/2021 1:48:20 PM CDT            Social History   Social History   (As Of: 10/22/2021 11:05:46 AM CDT)   Alcohol:  Current      Current, Wine (5 oz), Daily, 2 drinks/episode average.   (Last Updated: 5/11/2017 9:48:40 AM CDT by Jie Brenner LPN)          Tobacco:  Denies Tobacco Use       Never (less than 100 in lifetime)   (Last Updated: 7/7/2021 12:59:28 PM CDT by Mariah Valdovinos CMA)          Electronic Cigarette/Vaping:        Electronic Cigarette Use: Never.   (Last Updated: 7/7/2021 12:59:33 PM CDT by Mariah Valdovinos CMA)          Substance Abuse:  Denies Substance Abuse      Never   (Last Updated: 6/6/2019 11:18:48 AM CDT by Lety Chen)          Employment/School:        Retired, Work/School description: Teacher..  Highest education level: Masters.   (Last Updated: 6/6/2019 11:19:28 AM CDT by Lety Chen)          Home/Environment:        Marital status: .  1 children.  Living situation: Home/Independent.  Feels unsafe at home: No.  Family/Friends available for support: Yes.   (Last Updated: 7/7/2021 1:18:22 PM CDT by Catrina LOPEZ [facility])          Nutrition/Health:        Type of diet: Regular.   (Last Updated: 12/19/2011 8:42:08 AM CST by Jie Brenner LPN)          Exercise:  Regular exercise      Exercise type: Walking.   Comments:  12/19/2011 8:42 AM - Jie Brenner LPN: 3-5 miles daily.in [place].   (Last Updated: 12/19/2011 8:42:35 AM CST by Jie Brenner LPN)          Sexual:        Sexually active: Yes.  Identifies as female, Sexual orientation: Straight or heterosexual.  History of STD: No.  History of sexual abuse: No.   (Last Updated: 6/6/2019 11:20:15 AM CDT by Lety Chen)          Other:        First menses age 13.  No abnormal Pap smear.  LMP age 55.   (Last Updated: 6/6/2019 11:18:33 AM CDT by Lety Chen)

## 2022-02-16 NOTE — TELEPHONE ENCOUNTER
---------------------  From: Cheyanne Souza LPN (Phone Messages Pool ([id number]   To: [place] Message Pool ([id number]);     Sent: 12/8/2021 12:50:04 PM CST  Subject: Lomotil refill     Phone Message    PCP:   CODY      Time of Call:  12:42pm       Person Calling:  Pt  Phone number:  [phone]    Note:   Pt calling stating she has been trying to get a refill of Lomotil x 1 week. No request in chart from pharmacy. [facility] in [facility]  Pt up to date on visit.  Please advise on additional refills. Per  pt not due back until 7/22    Last office visit and reason:  10/22/21 with MJP---------------------  From: Mariah Valdovinos CMA ([place] Message Pool ([address]))   To: Esha Fritz MD;     Sent: 12/8/2021 1:32:53 PM CST  Subject: FW: Lomotil refill  ** Submitted: **  Order:atropine-diphenoxylate (Lomotil 2.5 mg-0.025 mg oral tablet)  2 tab(s)  PO  QID  start prior authorization if needed  Qty:  60 tab(s)        Refills:  3          Substitutions Allowed     PRN  for loose stools      Route To Pharmacy - [facility] #[id number]    Signed by Esha Fritz MD  12/8/2021 8:49:00 PM [facility]---------------------  From: Esha Fritz MD   To: CarmineL Message Pool ([id number]_[facility]);     Sent: 12/8/2021 2:49:52 PM CST  Subject: RE: Lomotil refillpt contacted at 1613 and advised

## 2022-02-16 NOTE — NURSING NOTE
CAGE Assessment Entered On:  6/3/2019 1:50 PM CDT    Performed On:  6/3/2019 1:50 PM CDT by Arleen Alvarez CMA               Assessment   Have you ever felt you should cut down on your drinking :   No   Have people annoyed you by criticizing your drinking :   No   Have you ever felt bad or guilty about your drinking :   No   Have you ever taken a drink first thing in the morning to steady your nerves or get rid of a hangover (Eye-opener) :   No   CAGE Score :   0    Arleen Alvarez CMA - 6/3/2019 1:50 PM CDT

## 2022-02-16 NOTE — TELEPHONE ENCOUNTER
Entered by Clara Pacheco MA on July 31, 2019 1:23:05 PM CDT  ---------------------  From: Clara Pacheco MA   To: [facility] #[id number]    Sent: 7/31/2019 1:23:05 PM CDT  Subject: Medication Management     ** Not Approved: Refill not appropriate, Filled 6/3/19 #60 with 5 refills **  atropine-diphenoxylate (DIPHENOXYLATE/ATROPINE 2.5MG TABS)  TAKE 2 TABLETS BY MOUTH FOUR TIMES DAILY AS NEEDED FOR LOOSE STOOLS  Qty:  60 tab(s)        Days Supply:  8        Refills:  0          Substitutions Allowed     Route To Pharmacy - [facility] #[id number]   Signed by Clara Pacheco MA            ------------------------------------------  From: [facility] #[id number]  To: Esha Fritz MD  Sent: July 31, 2019 1:03:09 PM CDT  Subject: Medication Management  Due: August 1, 2019 1:03:09 PM CDT    ** On Hold Pending Signature **  Drug: atropine-diphenoxylate (Lomotil 2.5 mg-0.025 mg oral tablet)  2 TAB(S) ORAL QID,PRN:FOR LOOSE STOOLS  Quantity: 60 tab(s)     Days Supply: 0         Refills: 4  Substitutions Allowed  Notes from Pharmacy: patient will contact pharmacy when refills are needed    Dispensed Drug: atropine-diphenoxylate (atropine-diphenoxylate 0.025 mg-2.5 mg oral tablet)  TAKE 2 TABLETS BY MOUTH FOUR TIMES DAILY AS NEEDED FOR LOOSE STOOLS  Quantity: 60 tab(s)     Days Supply: 8         Refills: 0  Substitutions Allowed  Notes from Pharmacy:   ------------------------------------------

## 2022-02-16 NOTE — TELEPHONE ENCOUNTER
---------------------  From: [place]   To: Esha Fritz MD;     Sent: 7/6/2020 1:13:18 PM CDT  Subject: General Message     Requesting refills on prescriptions.Please contact patient - last visit was 6/2019. Happy to refill, but would like clarification of when she can schedule a visit. Thanks---------------------  From: Esha Fritz MD   To: [facility] Message Pool ([id number]_[facility]);     Sent: 7/6/2020 1:25:07 PM CDT  Subject: RE: General MessageReturn Call    Time: 2:06pm    Note: LMTCB---------------------  From: Laura Carranza LPN   To: [facility] Pool ([address]);     Sent: 7/6/2020 4:30:36 PM CDT  Subject: FW: General Message     Patient has appt scheduled with [facility] on 07/27/20 but will be needing refills of medication sent to the [facility] in [facility] for vitamin D and  Lomotil 2.5 mg-0.025 mg oral tablet.  Please Advise refills.---------------------  From: Clara Pacheco MA ([facility] Message Pool ([id number]_[facility]))   To: Esha Fritz MD;     Sent: 7/6/2020 4:58:14 PM CDT  Subject: FW: General Message  ** Submitted: **  Order:atropine-diphenoxylate (Lomotil 2.5 mg-0.025 mg oral tablet)  2 tab(s)  PO  QID  start prior authorization if needed  Qty:  60 tab(s)        Refills:  0          Substitutions Allowed     PRN  for loose stools      Route To Pharmacy - [facility] #[id number]    Signed by Esha Fritz MD  7/7/2020 1:09:00 AM [facility]    ** Submitted: **  Order:ergocalciferol (ergocalciferol 50,000 intl units (1.25 mg) oral capsule)  See Instructions  TAKE 1 CAPSULE BY MOUTH EVERY WEEK  Qty:  12 cap(s)        Refills:  0          [id number]    Signed by Esha Fritz MD  7/7/2020 1:09:00 AM [facility]    ** Submitted: **  Order:traMADol (traMADol 50 mg oral tablet)  1 tab(s)  Oral  q4 hrs  Qty:  120 tab(s)        Refills:  0          PRN  AS NEEDED FOR PAIN      Route To [facility] #[id number]    Signed by  Esha Fritz MD  7/7/2020 1:09:00 AM [facility]    ** Submitted: **  Order:ciprofloxacin (ciprofloxacin 500 mg oral tablet)  See Instructions  TAKE 1 TABLET BY MOUTH EVERY 12 HOURS FOR 10 DAYS AS NEEDED  Qty:  20 tab(s)        Refills:  2          PRN  as needed      Route To Pharmacy - [facility] #[id number]    Signed by Esha Fritz MD  7/7/2020 1:09:00 AM [facility]---------------------  From: Esha Fritz MD   To: [facility] Message Pool ([facility]);     Sent: 7/6/2020 8:10:26 PM CDT  Subject: RE: General MessageLet patient know those were sent in.

## 2022-02-16 NOTE — TELEPHONE ENCOUNTER
---------------------  From: Reta Khan CMA   Sent: 1/11/2021 9:37:31 AM CST  Subject: Lomotil PA     Done via covermymeds. Waiting for response.

## 2022-02-16 NOTE — NURSING NOTE
Hearing and Vision Screening Entered On:  6/3/2019 1:11 PM CDT    Performed On:  6/3/2019 1:09 PM CDT by Clara Pacheco MA               Hearing and Vision Screening   Audiogram Result Right Ear :   Fail   Audiogram Result Left Ear :   Fail   Hearing Screen Comments :   Missed 2000hz and 4000hz bilateral    Clara Pacheco MA - 6/3/2019 1:09 PM CDT
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
## 2022-03-01 ENCOUNTER — TELEPHONE (OUTPATIENT)
Dept: FAMILY MEDICINE | Facility: CLINIC | Age: 82
End: 2022-03-01

## 2022-03-01 DIAGNOSIS — I10 BENIGN ESSENTIAL HYPERTENSION: Primary | ICD-10-CM

## 2022-03-01 RX ORDER — AMLODIPINE BESYLATE 2.5 MG/1
2.5 TABLET ORAL DAILY
Qty: 30 TABLET | Refills: 1 | Status: SHIPPED | OUTPATIENT
Start: 2022-03-01

## 2022-03-01 NOTE — TELEPHONE ENCOUNTER
I do not have a [place] license so cannot do a visit. I will send a low dose of amlodipine to her pharmacy in [place] for 30 days with one refill. Keep track of BP until her visit in April.

## 2022-03-01 NOTE — TELEPHONE ENCOUNTER
Kylah (daughter in law) reporting.  [phone]    Pt is in [place]. While in [place] has fallen and broken hip, then needed pacemaker, is in PT, recently has pulled muscle in hip.  Pt has plan to establish care in [place] in April.    Concerns with HTN. BP is elevated in AM,   144/76 AM then in /72  159/75 AM then in /62    PT reports: 149//76- 160/76    Are you able to offer a video visit?  Are you able to prescribe any medication until they establish care in [place]?    Please advise  LINA Wagner  [facility]

## 2022-03-01 NOTE — TELEPHONE ENCOUNTER
TC returned to Kylah, informed of directions per [facility]. Informed prescription was sent. Kylah stated pt was able to obtain an appt sooner than April. Kylah expressed appreciation.

## 2022-03-15 DIAGNOSIS — I10 BENIGN ESSENTIAL HYPERTENSION: ICD-10-CM

## 2022-03-16 RX ORDER — AMLODIPINE BESYLATE 2.5 MG/1
TABLET ORAL
Qty: 90 TABLET | OUTPATIENT
Start: 2022-03-16

## 2022-03-16 NOTE — TELEPHONE ENCOUNTER
Refill request refused.    Prescription sent for 30 days and one refill on 3/1/22 [facility].     Patient needs office visit for 90 day refill.      Jefferson Mejia RN  [facility]

## 2022-04-07 PROBLEM — N39.0 RECURRENT URINARY TRACT INFECTION: Status: ACTIVE | Noted: 2022-04-07

## 2022-04-07 PROBLEM — K58.9 IRRITABLE BOWEL SYNDROME: Status: ACTIVE | Noted: 2022-04-07

## 2022-04-07 PROBLEM — M85.80 OSTEOPENIA: Status: ACTIVE | Noted: 2022-04-07

## 2022-04-07 PROBLEM — E55.9 VITAMIN D DEFICIENCY: Status: ACTIVE | Noted: 2022-04-07

## 2022-04-07 RX ORDER — DIPHENOXYLATE HCL/ATROPINE 2.5-.025MG
2 TABLET ORAL 4 TIMES DAILY PRN
COMMUNITY
Start: 2021-12-08 | End: 2022-08-02

## 2022-04-07 RX ORDER — ERGOCALCIFEROL 1.25 MG/1
1 CAPSULE ORAL WEEKLY
COMMUNITY
Start: 2021-07-07 | End: 2023-03-23

## 2022-04-07 RX ORDER — TRAMADOL HYDROCHLORIDE 50 MG/1
50 TABLET ORAL EVERY 4 HOURS PRN
COMMUNITY
Start: 2021-06-17 | End: 2023-03-23

## 2022-04-12 ENCOUNTER — TELEPHONE (OUTPATIENT)
Dept: FAMILY MEDICINE | Facility: CLINIC | Age: 82
End: 2022-04-12

## 2022-04-12 NOTE — TELEPHONE ENCOUNTER
Reason for Call:  Other prescription    Detailed comments: [facility] calling in regards to a medication appeal for the patient. Can call back at [phone]    Phone Number Patient can be reached at: Other phone number:  [phone] [facility]    Best Time: anytime    Can we leave a detailed message on this number? Not Applicable    Call taken on 4/12/2022 at 8:57 AM by Chandni Vallejo

## 2022-04-18 NOTE — TELEPHONE ENCOUNTER
Tried calling [facility] at [phone]  Unable to leave message and no answer.

## 2022-07-08 ENCOUNTER — TELEPHONE (OUTPATIENT)
Dept: FAMILY MEDICINE | Facility: CLINIC | Age: 82
End: 2022-07-08

## 2022-07-08 NOTE — TELEPHONE ENCOUNTER
Reason for Call:  Other prescription    Detailed comments: Patient inquiring to see if a Rx can be sent in for Paxlovid.  Patient is going to [facility] on Sat 7.16.2022 and would like to be prepared if she contracts Covid on her trip.  [facility] [place] [facility].  Or call patient to address.    Phone Number Patient can be reached at: Home number on file [phone] (home)    Best Time: anytime    Can we leave a detailed message on this number? YES    Call taken on 7/8/2022 at 3:10 PM by Liz Perry

## 2022-08-02 DIAGNOSIS — K58.0 IRRITABLE BOWEL SYNDROME WITH DIARRHEA: Primary | ICD-10-CM

## 2022-08-02 RX ORDER — DIPHENOXYLATE HCL/ATROPINE 2.5-.025MG
2 TABLET ORAL 4 TIMES DAILY PRN
Qty: 60 TABLET | Refills: 3 | Status: SHIPPED | OUTPATIENT
Start: 2022-08-02 | End: 2023-03-24

## 2022-08-02 NOTE — TELEPHONE ENCOUNTER
Routing refill request to provider for review/approval because:  Drug not on the [facility] refill protocol     Last Written Prescription Date:  12/8/2021  Last Fill Quantity: 60,  # refills: 3   Last office visit provider:  10/22/2021 RUBA     Requested Prescriptions   Pending Prescriptions Disp Refills     diphenoxylate-atropine (LOMOTIL) 2.5-0.025 MG tablet       Sig: Take 2 tablets by mouth 4 times daily as needed       There is no refill protocol information for this order          Sylvie Mejia RN 08/02/22 4:14 PM

## 2023-03-23 ENCOUNTER — TELEPHONE (OUTPATIENT)
Dept: FAMILY MEDICINE | Facility: CLINIC | Age: 83
End: 2023-03-23
Payer: COMMERCIAL

## 2023-03-23 DIAGNOSIS — K58.0 IRRITABLE BOWEL SYNDROME WITH DIARRHEA: ICD-10-CM

## 2023-03-23 DIAGNOSIS — M85.89 OSTEOPENIA OF MULTIPLE SITES: Primary | ICD-10-CM

## 2023-03-23 DIAGNOSIS — M19.90 ARTHRITIS: ICD-10-CM

## 2023-03-24 RX ORDER — ERGOCALCIFEROL 1.25 MG/1
1 CAPSULE ORAL WEEKLY
Qty: 13 CAPSULE | Refills: 4 | Status: SHIPPED | OUTPATIENT
Start: 2023-03-24

## 2023-03-24 RX ORDER — TRAMADOL HYDROCHLORIDE 50 MG/1
50 TABLET ORAL EVERY 4 HOURS PRN
Qty: 30 TABLET | Refills: 1 | Status: SHIPPED | OUTPATIENT
Start: 2023-03-24

## 2023-03-24 RX ORDER — DIPHENOXYLATE HCL/ATROPINE 2.5-.025MG
2 TABLET ORAL 4 TIMES DAILY PRN
Qty: 60 TABLET | Refills: 3 | Status: SHIPPED | OUTPATIENT
Start: 2023-03-24